# Patient Record
Sex: MALE | URBAN - METROPOLITAN AREA
[De-identification: names, ages, dates, MRNs, and addresses within clinical notes are randomized per-mention and may not be internally consistent; named-entity substitution may affect disease eponyms.]

---

## 2017-11-13 NOTE — PATIENT DISCUSSION
(H25.11) Age-related nuclear cataract, right eye - Assesment : Examination revealed cataract. 2+ NS  OLD K SCAR- POSSIBLE H/O TRAUMA FROM BASEBALL - Plan : Monitor for changes. Advised patient to call our office with decreased vision or increased symptoms.

## 2018-12-13 NOTE — PATIENT DISCUSSION
(H25.11) Age-related nuclear cataract, right eye - Assesment : Examination revealed cataract. 2+ NS  OLD K SCAR- POSSIBLE H/O TRAUMA FROM BASEBALL, PT DENIES ANY HX OF VIRUS, THE SCAR COULD PRESENT A PROBLEM AT CATARACT SURGERY PATIENT NOT BOTHERED BY THE CATARACT - Plan : Monitor for changes. Advised patient to call our office with decreased vision or increased symptoms.   1 YEAR EXAM

## 2019-02-12 ENCOUNTER — IMPORTED ENCOUNTER (OUTPATIENT)
Dept: URBAN - METROPOLITAN AREA CLINIC 9 | Facility: CLINIC | Age: 69
End: 2019-02-12

## 2019-02-26 ENCOUNTER — IMPORTED ENCOUNTER (OUTPATIENT)
Dept: URBAN - METROPOLITAN AREA CLINIC 9 | Facility: CLINIC | Age: 69
End: 2019-02-26

## 2019-04-15 ENCOUNTER — IMPORTED ENCOUNTER (OUTPATIENT)
Dept: URBAN - METROPOLITAN AREA CLINIC 9 | Facility: CLINIC | Age: 69
End: 2019-04-15

## 2019-05-09 ENCOUNTER — IMPORTED ENCOUNTER (OUTPATIENT)
Dept: URBAN - METROPOLITAN AREA CLINIC 9 | Facility: CLINIC | Age: 69
End: 2019-05-09

## 2019-05-14 ENCOUNTER — IMPORTED ENCOUNTER (OUTPATIENT)
Dept: URBAN - METROPOLITAN AREA CLINIC 9 | Facility: CLINIC | Age: 69
End: 2019-05-14

## 2019-07-17 ENCOUNTER — IMPORTED ENCOUNTER (OUTPATIENT)
Dept: URBAN - METROPOLITAN AREA CLINIC 9 | Facility: CLINIC | Age: 69
End: 2019-07-17

## 2019-07-29 ENCOUNTER — IMPORTED ENCOUNTER (OUTPATIENT)
Dept: URBAN - METROPOLITAN AREA CLINIC 9 | Facility: CLINIC | Age: 69
End: 2019-07-29

## 2019-10-29 ENCOUNTER — IMPORTED ENCOUNTER (OUTPATIENT)
Dept: URBAN - METROPOLITAN AREA CLINIC 9 | Facility: CLINIC | Age: 69
End: 2019-10-29

## 2019-11-04 ENCOUNTER — IMPORTED ENCOUNTER (OUTPATIENT)
Dept: URBAN - METROPOLITAN AREA CLINIC 9 | Facility: CLINIC | Age: 69
End: 2019-11-04

## 2019-12-17 ENCOUNTER — IMPORTED ENCOUNTER (OUTPATIENT)
Dept: URBAN - METROPOLITAN AREA CLINIC 9 | Facility: CLINIC | Age: 69
End: 2019-12-17

## 2020-01-14 ENCOUNTER — IMPORTED ENCOUNTER (OUTPATIENT)
Dept: URBAN - METROPOLITAN AREA CLINIC 9 | Facility: CLINIC | Age: 70
End: 2020-01-14

## 2021-10-16 ASSESSMENT — TONOMETRY
OD_IOP_MMHG: 23
OS_IOP_MMHG: 14
OS_IOP_MMHG: 12
OD_IOP_MMHG: 20
OS_IOP_MMHG: 18
OS_IOP_MMHG: 18
OS_IOP_MMHG: 16
OS_IOP_MMHG: 17
OD_IOP_MMHG: 16
OS_IOP_MMHG: 16
OD_IOP_MMHG: 17
OD_IOP_MMHG: 16
OS_IOP_MMHG: 13
OD_IOP_MMHG: 12
OS_IOP_MMHG: 11
OD_IOP_MMHG: 17

## 2021-10-16 ASSESSMENT — KERATOMETRY
OD_AXISANGLE_DEGREES: 86
OD_AXISANGLE2_DEGREES: 176
OS_AXISANGLE2_DEGREES: 178
OS_AXISANGLE_DEGREES: 92
OS_K1POWER_DIOPTERS: 40.875
OS_K2POWER_DIOPTERS: 44
OD_K1POWER_DIOPTERS: 42
OS_K2POWER_DIOPTERS: 67.75
OS_K1POWER_DIOPTERS: 34.25
OS_AXISANGLE_DEGREES: 88
OD_K2POWER_DIOPTERS: 42.25
OS_AXISANGLE2_DEGREES: 2

## 2021-10-16 ASSESSMENT — VISUAL ACUITY
OD_SC: 20/70 - SN
OS_SC: 20/200 SN
OD_SC: 20/200 SN
OS_SC: 20/80 SN
OD_SC: 20/100 + SN
OD_CC: 20/50 +2 SN
OS_SC: 20/80 SN
OS_SC: 20/100 SN
OD_SC: 20/70 SN
OS_CC: 20/200 SN
OS_SC: 20/80 SN
OD_CC: 20/50 +2 SN
OD_SC: 20/70 + SN
OS_SC: 20/200 SN
OS_CC: 20/200 SN
OD_SC: 20/80 - SN
OS_CC: 20/80 SN
OS_SC: 20/200 SN
OS_PH: 20/200 SN
OD_SC: 20/70 SN
OS_SC: 20/80 SN
OD_CC: 20/40 SN
OS_SC: 20/400 SN
OD_SC: 20/70 + SN
OS_SC: 20/60 SN
OD_PH: 20/80 SN
OD_PH: 20/80 SN
OD_SC: 20/80 - SN

## 2022-07-06 RX ORDER — CARVEDILOL 3.12 MG/1
TABLET ORAL
COMMUNITY

## 2022-07-06 RX ORDER — FLUOXETINE HYDROCHLORIDE 40 MG/1
1 CAPSULE ORAL
COMMUNITY

## 2022-07-06 RX ORDER — CLOPIDOGREL BISULFATE 75 MG/1
TABLET ORAL
COMMUNITY

## 2022-07-06 RX ORDER — GABAPENTIN 300 MG/1
1 CAPSULE ORAL
COMMUNITY

## 2022-07-06 RX ORDER — GLIPIZIDE 10 MG/1
TABLET ORAL
COMMUNITY

## 2022-07-06 RX ORDER — ATORVASTATIN CALCIUM 80 MG/1
TABLET, FILM COATED ORAL
COMMUNITY

## 2022-07-06 RX ORDER — FAMOTIDINE 10 MG
TABLET ORAL
COMMUNITY

## 2022-08-31 PROBLEM — Z85.038 HISTORY OF COLON CANCER, STAGE III: Status: ACTIVE | Noted: 2022-08-31

## 2022-08-31 PROBLEM — C20 RECTAL CANCER (HCC): Status: ACTIVE | Noted: 2022-08-31

## 2022-08-31 PROBLEM — D36.9 TUBULAR ADENOMA: Status: ACTIVE | Noted: 2022-08-31

## 2022-08-31 PROBLEM — Z90.49 HISTORY OF COLON RESECTION: Status: ACTIVE | Noted: 2022-08-31

## 2022-08-31 PROBLEM — G56.32 RADIAL TUNNEL SYNDROME OF LEFT UPPER EXTREMITY: Status: ACTIVE | Noted: 2022-08-31

## 2024-12-18 ENCOUNTER — COMPREHENSIVE EXAM (OUTPATIENT)
Age: 74
End: 2024-12-18

## 2024-12-18 DIAGNOSIS — H52.4: ICD-10-CM

## 2024-12-18 DIAGNOSIS — H25.11: ICD-10-CM

## 2024-12-18 DIAGNOSIS — H04.123: ICD-10-CM

## 2024-12-18 DIAGNOSIS — H43.812: ICD-10-CM

## 2024-12-18 DIAGNOSIS — Z79.4: ICD-10-CM

## 2024-12-18 DIAGNOSIS — Z96.1: ICD-10-CM

## 2024-12-18 DIAGNOSIS — E11.3393: ICD-10-CM

## 2024-12-18 PROCEDURE — 92015 DETERMINE REFRACTIVE STATE: CPT

## 2024-12-18 PROCEDURE — 92134 CPTRZ OPH DX IMG PST SGM RTA: CPT

## 2024-12-18 PROCEDURE — 92004 COMPRE OPH EXAM NEW PT 1/>: CPT

## 2025-01-14 ENCOUNTER — CONSULTATION/EVALUATION (OUTPATIENT)
Age: 75
End: 2025-01-14

## 2025-01-14 DIAGNOSIS — Z79.4: ICD-10-CM

## 2025-01-14 DIAGNOSIS — E11.3393: ICD-10-CM

## 2025-01-14 DIAGNOSIS — H43.812: ICD-10-CM

## 2025-01-14 DIAGNOSIS — H25.11: ICD-10-CM

## 2025-01-14 PROCEDURE — 92134 CPTRZ OPH DX IMG PST SGM RTA: CPT

## 2025-01-14 PROCEDURE — 92014 COMPRE OPH EXAM EST PT 1/>: CPT

## 2025-01-30 ENCOUNTER — PRE-OP/H&P (OUTPATIENT)
Age: 75
End: 2025-01-30

## 2025-01-30 DIAGNOSIS — H25.11: ICD-10-CM

## 2025-01-30 DIAGNOSIS — Z96.1: ICD-10-CM

## 2025-01-30 PROCEDURE — 92136 OPHTHALMIC BIOMETRY: CPT

## 2025-02-05 ENCOUNTER — SURGERY/PROCEDURE (OUTPATIENT)
Age: 75
End: 2025-02-05

## 2025-02-05 DIAGNOSIS — H25.11: ICD-10-CM

## 2025-02-05 PROBLEM — Z96.1: Noted: 2025-02-05

## 2025-02-05 PROCEDURE — 66984 XCAPSL CTRC RMVL W/O ECP: CPT

## 2025-02-06 ENCOUNTER — POST-OP (OUTPATIENT)
Age: 75
End: 2025-02-06

## 2025-02-06 DIAGNOSIS — Z96.1: ICD-10-CM

## 2025-02-06 PROCEDURE — 99024 POSTOP FOLLOW-UP VISIT: CPT

## 2025-02-20 ENCOUNTER — POST-OP (OUTPATIENT)
Age: 75
End: 2025-02-20

## 2025-02-20 DIAGNOSIS — Z96.1: ICD-10-CM

## 2025-02-20 PROCEDURE — 99024 POSTOP FOLLOW-UP VISIT: CPT
